# Patient Record
Sex: FEMALE | Race: OTHER
[De-identification: names, ages, dates, MRNs, and addresses within clinical notes are randomized per-mention and may not be internally consistent; named-entity substitution may affect disease eponyms.]

---

## 2022-02-18 ENCOUNTER — TRANSCRIPTION ENCOUNTER (OUTPATIENT)
Age: 27
End: 2022-02-18

## 2022-02-19 ENCOUNTER — RESULT REVIEW (OUTPATIENT)
Age: 27
End: 2022-02-19

## 2022-02-19 ENCOUNTER — INPATIENT (INPATIENT)
Facility: HOSPITAL | Age: 27
LOS: 0 days | Discharge: HOME | DRG: 770 | End: 2022-02-19
Attending: OBSTETRICS & GYNECOLOGY | Admitting: OBSTETRICS & GYNECOLOGY
Payer: COMMERCIAL

## 2022-02-19 VITALS
DIASTOLIC BLOOD PRESSURE: 92 MMHG | TEMPERATURE: 98 F | OXYGEN SATURATION: 98 % | RESPIRATION RATE: 18 BRPM | SYSTOLIC BLOOD PRESSURE: 137 MMHG | HEIGHT: 62 IN | WEIGHT: 190.04 LBS | HEART RATE: 100 BPM

## 2022-02-19 VITALS
RESPIRATION RATE: 20 BRPM | OXYGEN SATURATION: 97 % | SYSTOLIC BLOOD PRESSURE: 125 MMHG | DIASTOLIC BLOOD PRESSURE: 88 MMHG | HEART RATE: 81 BPM

## 2022-02-19 DIAGNOSIS — Z98.890 OTHER SPECIFIED POSTPROCEDURAL STATES: Chronic | ICD-10-CM

## 2022-02-19 LAB
ALBUMIN SERPL ELPH-MCNC: 4.8 G/DL — SIGNIFICANT CHANGE UP (ref 3.3–5)
ALP SERPL-CCNC: 93 U/L — SIGNIFICANT CHANGE UP (ref 40–120)
ALT FLD-CCNC: 18 U/L — SIGNIFICANT CHANGE UP (ref 10–45)
ANION GAP SERPL CALC-SCNC: 12 MMOL/L — SIGNIFICANT CHANGE UP (ref 5–17)
APPEARANCE UR: CLEAR — SIGNIFICANT CHANGE UP
APTT BLD: 34.5 SEC — SIGNIFICANT CHANGE UP (ref 27.5–35.5)
AST SERPL-CCNC: 18 U/L — SIGNIFICANT CHANGE UP (ref 10–40)
BACTERIA # UR AUTO: PRESENT /HPF
BASOPHILS # BLD AUTO: 0.03 K/UL — SIGNIFICANT CHANGE UP (ref 0–0.2)
BASOPHILS NFR BLD AUTO: 0.5 % — SIGNIFICANT CHANGE UP (ref 0–2)
BILIRUB SERPL-MCNC: 0.9 MG/DL — SIGNIFICANT CHANGE UP (ref 0.2–1.2)
BILIRUB UR-MCNC: NEGATIVE — SIGNIFICANT CHANGE UP
BLD GP AB SCN SERPL QL: NEGATIVE — SIGNIFICANT CHANGE UP
BUN SERPL-MCNC: 19 MG/DL — SIGNIFICANT CHANGE UP (ref 7–23)
CALCIUM SERPL-MCNC: 9.6 MG/DL — SIGNIFICANT CHANGE UP (ref 8.4–10.5)
CHLORIDE SERPL-SCNC: 104 MMOL/L — SIGNIFICANT CHANGE UP (ref 96–108)
CO2 SERPL-SCNC: 22 MMOL/L — SIGNIFICANT CHANGE UP (ref 22–31)
COLOR SPEC: YELLOW — SIGNIFICANT CHANGE UP
CREAT SERPL-MCNC: 0.75 MG/DL — SIGNIFICANT CHANGE UP (ref 0.5–1.3)
DIFF PNL FLD: NEGATIVE — SIGNIFICANT CHANGE UP
EOSINOPHIL # BLD AUTO: 0.1 K/UL — SIGNIFICANT CHANGE UP (ref 0–0.5)
EOSINOPHIL NFR BLD AUTO: 1.5 % — SIGNIFICANT CHANGE UP (ref 0–6)
EPI CELLS # UR: SIGNIFICANT CHANGE UP /HPF (ref 0–5)
GLUCOSE SERPL-MCNC: 99 MG/DL — SIGNIFICANT CHANGE UP (ref 70–99)
GLUCOSE UR QL: NEGATIVE — SIGNIFICANT CHANGE UP
HCG SERPL-ACNC: 8 MIU/ML — HIGH
HCT VFR BLD CALC: 42.4 % — SIGNIFICANT CHANGE UP (ref 34.5–45)
HGB BLD-MCNC: 14.4 G/DL — SIGNIFICANT CHANGE UP (ref 11.5–15.5)
IMM GRANULOCYTES NFR BLD AUTO: 0.2 % — SIGNIFICANT CHANGE UP (ref 0–1.5)
INR BLD: 0.97 — SIGNIFICANT CHANGE UP (ref 0.88–1.16)
KETONES UR-MCNC: NEGATIVE — SIGNIFICANT CHANGE UP
LEUKOCYTE ESTERASE UR-ACNC: ABNORMAL
LYMPHOCYTES # BLD AUTO: 1.81 K/UL — SIGNIFICANT CHANGE UP (ref 1–3.3)
LYMPHOCYTES # BLD AUTO: 27.7 % — SIGNIFICANT CHANGE UP (ref 13–44)
MCHC RBC-ENTMCNC: 31 PG — SIGNIFICANT CHANGE UP (ref 27–34)
MCHC RBC-ENTMCNC: 34 GM/DL — SIGNIFICANT CHANGE UP (ref 32–36)
MCV RBC AUTO: 91.4 FL — SIGNIFICANT CHANGE UP (ref 80–100)
MONOCYTES # BLD AUTO: 0.38 K/UL — SIGNIFICANT CHANGE UP (ref 0–0.9)
MONOCYTES NFR BLD AUTO: 5.8 % — SIGNIFICANT CHANGE UP (ref 2–14)
NEUTROPHILS # BLD AUTO: 4.21 K/UL — SIGNIFICANT CHANGE UP (ref 1.8–7.4)
NEUTROPHILS NFR BLD AUTO: 64.3 % — SIGNIFICANT CHANGE UP (ref 43–77)
NITRITE UR-MCNC: NEGATIVE — SIGNIFICANT CHANGE UP
NRBC # BLD: 0 /100 WBCS — SIGNIFICANT CHANGE UP (ref 0–0)
PH UR: 6 — SIGNIFICANT CHANGE UP (ref 5–8)
PLATELET # BLD AUTO: 295 K/UL — SIGNIFICANT CHANGE UP (ref 150–400)
POTASSIUM SERPL-MCNC: 4.2 MMOL/L — SIGNIFICANT CHANGE UP (ref 3.5–5.3)
POTASSIUM SERPL-SCNC: 4.2 MMOL/L — SIGNIFICANT CHANGE UP (ref 3.5–5.3)
PROT SERPL-MCNC: 8.3 G/DL — SIGNIFICANT CHANGE UP (ref 6–8.3)
PROT UR-MCNC: NEGATIVE MG/DL — SIGNIFICANT CHANGE UP
PROTHROM AB SERPL-ACNC: 11.7 SEC — SIGNIFICANT CHANGE UP (ref 10.6–13.6)
RBC # BLD: 4.64 M/UL — SIGNIFICANT CHANGE UP (ref 3.8–5.2)
RBC # FLD: 12.3 % — SIGNIFICANT CHANGE UP (ref 10.3–14.5)
RH IG SCN BLD-IMP: POSITIVE — SIGNIFICANT CHANGE UP
RH IG SCN BLD-IMP: POSITIVE — SIGNIFICANT CHANGE UP
SARS-COV-2 RNA SPEC QL NAA+PROBE: SIGNIFICANT CHANGE UP
SODIUM SERPL-SCNC: 138 MMOL/L — SIGNIFICANT CHANGE UP (ref 135–145)
SP GR SPEC: 1.02 — SIGNIFICANT CHANGE UP (ref 1–1.03)
UROBILINOGEN FLD QL: 0.2 E.U./DL — SIGNIFICANT CHANGE UP
WBC # BLD: 6.54 K/UL — SIGNIFICANT CHANGE UP (ref 3.8–10.5)
WBC # FLD AUTO: 6.54 K/UL — SIGNIFICANT CHANGE UP (ref 3.8–10.5)
WBC UR QL: < 5 /HPF — SIGNIFICANT CHANGE UP

## 2022-02-19 PROCEDURE — 76856 US EXAM PELVIC COMPLETE: CPT | Mod: 26

## 2022-02-19 PROCEDURE — 85730 THROMBOPLASTIN TIME PARTIAL: CPT

## 2022-02-19 PROCEDURE — 99285 EMERGENCY DEPT VISIT HI MDM: CPT

## 2022-02-19 PROCEDURE — 76830 TRANSVAGINAL US NON-OB: CPT | Mod: 26

## 2022-02-19 PROCEDURE — 88305 TISSUE EXAM BY PATHOLOGIST: CPT | Mod: 26

## 2022-02-19 PROCEDURE — 76856 US EXAM PELVIC COMPLETE: CPT

## 2022-02-19 PROCEDURE — 86901 BLOOD TYPING SEROLOGIC RH(D): CPT

## 2022-02-19 PROCEDURE — 84702 CHORIONIC GONADOTROPIN TEST: CPT

## 2022-02-19 PROCEDURE — 80053 COMPREHEN METABOLIC PANEL: CPT

## 2022-02-19 PROCEDURE — 76830 TRANSVAGINAL US NON-OB: CPT

## 2022-02-19 PROCEDURE — 36415 COLL VENOUS BLD VENIPUNCTURE: CPT

## 2022-02-19 PROCEDURE — 86850 RBC ANTIBODY SCREEN: CPT

## 2022-02-19 PROCEDURE — 85610 PROTHROMBIN TIME: CPT

## 2022-02-19 PROCEDURE — 81001 URINALYSIS AUTO W/SCOPE: CPT

## 2022-02-19 PROCEDURE — 85025 COMPLETE CBC W/AUTO DIFF WBC: CPT

## 2022-02-19 PROCEDURE — 87635 SARS-COV-2 COVID-19 AMP PRB: CPT

## 2022-02-19 PROCEDURE — 86900 BLOOD TYPING SEROLOGIC ABO: CPT

## 2022-02-19 PROCEDURE — 88305 TISSUE EXAM BY PATHOLOGIST: CPT

## 2022-02-19 DEVICE — MYOSURE TISSUE REMOVAL DEVICE XL: Type: IMPLANTABLE DEVICE | Status: FUNCTIONAL

## 2022-02-19 DEVICE — MYOSURE TISSUE REMOVAL DEVICE REACH: Type: IMPLANTABLE DEVICE | Status: FUNCTIONAL

## 2022-02-19 RX ORDER — ACETAMINOPHEN 500 MG
1000 TABLET ORAL EVERY 6 HOURS
Refills: 0 | Status: DISCONTINUED | OUTPATIENT
Start: 2022-02-19 | End: 2022-02-19

## 2022-02-19 RX ORDER — ACETAMINOPHEN 500 MG
2 TABLET ORAL
Qty: 0 | Refills: 0 | DISCHARGE
Start: 2022-02-19

## 2022-02-19 RX ORDER — SODIUM CHLORIDE 9 MG/ML
1000 INJECTION, SOLUTION INTRAVENOUS
Refills: 0 | Status: DISCONTINUED | OUTPATIENT
Start: 2022-02-19 | End: 2022-02-19

## 2022-02-19 RX ORDER — KETOROLAC TROMETHAMINE 30 MG/ML
30 SYRINGE (ML) INJECTION EVERY 8 HOURS
Refills: 0 | Status: DISCONTINUED | OUTPATIENT
Start: 2022-02-19 | End: 2022-02-19

## 2022-02-19 RX ORDER — SIMETHICONE 80 MG/1
80 TABLET, CHEWABLE ORAL EVERY 6 HOURS
Refills: 0 | Status: DISCONTINUED | OUTPATIENT
Start: 2022-02-19 | End: 2022-02-19

## 2022-02-19 RX ORDER — HYDROMORPHONE HYDROCHLORIDE 2 MG/ML
0.2 INJECTION INTRAMUSCULAR; INTRAVENOUS; SUBCUTANEOUS
Refills: 0 | Status: DISCONTINUED | OUTPATIENT
Start: 2022-02-19 | End: 2022-02-19

## 2022-02-19 RX ORDER — ONDANSETRON 8 MG/1
8 TABLET, FILM COATED ORAL EVERY 8 HOURS
Refills: 0 | Status: DISCONTINUED | OUTPATIENT
Start: 2022-02-19 | End: 2022-02-19

## 2022-02-19 RX ADMIN — SODIUM CHLORIDE 125 MILLILITER(S): 9 INJECTION, SOLUTION INTRAVENOUS at 14:03

## 2022-02-19 NOTE — ED PROVIDER NOTE - OBJECTIVE STATEMENT
28yo  female presents due to concerns for retained POC. Pt reports D&C at ~10wk EGA on . She states at follow up appointment with Dr. Bullock she had retained products so was given antibiotics and "medication for uterine contraction." At her subsequent appointment on  she had vaginal bleeding so was instructed to follow up again which she did yesterday. She states that at that time she had retained products so was instructed to present to ED for evaluation. Today she reports lower abdominal pain and subjective fevers last night. She denies vomiting, vaginal discharge or vaginal bleeding.

## 2022-02-19 NOTE — H&P ADULT - NSHPPHYSICALEXAM_GEN_ALL_CORE
PHYSICAL EXAM:   Vital Signs Last 24 Hrs  T(C): 36.9 (19 Feb 2022 08:23), Max: 36.9 (19 Feb 2022 08:23)  T(F): 98.5 (19 Feb 2022 08:23), Max: 98.5 (19 Feb 2022 08:23)  HR: 100 (19 Feb 2022 08:23) (100 - 100)  BP: 137/92 (19 Feb 2022 08:23) (137/92 - 137/92)  BP(mean): --  RR: 18 (19 Feb 2022 08:23) (18 - 18)  SpO2: 98% (19 Feb 2022 08:23) (98% - 98%)    **************************  Constitutional: Alert & Oriented x3, No acute distress  Respiratory: Clear to ausculation bilaterally; no wheezing, rhonchi, or crackles  Cardiovascular: regular rate and rhythm, no murmurs, or gallops  Gastrointestinal: soft, non tender, positive bowel sounds, no rebound or guarding   Pelvic exam:  deferred per attending   Extremities: no calf tenderness or swelling

## 2022-02-19 NOTE — H&P ADULT - HISTORY OF PRESENT ILLNESS
28yo  presents for retained POC from a D&C at 10wga . Pt was initially referred to a [facility] of unknown name by Dr. Bullock, her primary GYN, for MAB. Pt states the procedure was uncomplicated. She then followed up with Kobe , at which point he saw retained POC on US. She was asymptomatic at that point. She then returned , repeat US showed retained POC, she was given abx and cytotec. She then followed up again , US showed continued retained POC. Pt at that point had some light vaginal bleeding, which resolved 2/10. She then returned to the office yesterday, , again retained POC were seen on US in the office. Pt returned home, given precautions to go to ED for any concerns. Pt states that last night she had mild left sided pelvic pain as well as a fever, pt does not know how high. She then went to bed and came to the ED today. Denies continued fevers, pain, VB, abnormal vaginal discharge, chills, body aches. Pt had a croissant at 08:30.       OB Hx: SAB x 3, MAB as above  PMHx: denies  SHx: open appendectomy 2006  Meds: denies  Allergies: NKDA

## 2022-02-19 NOTE — ED PROVIDER NOTE - CLINICAL SUMMARY MEDICAL DECISION MAKING FREE TEXT BOX
Pt afebrile and hemodynamically stable. she has mild suprapubic ttp on exam. Denies vaginal bleeding today. Labs stable. US pelvis notable for retained POC with blood clot. Gynecology consulted. Will take pt to OR for d&c. Pt aware of plan.

## 2022-02-19 NOTE — H&P ADULT - ASSESSMENT
26yo  presents for retained POC from a D&C at 10wga .  -VSS, afebrile with WBC 6.5, no concerns for current sepsis  -Will add on for Class IV D&C   -NPO  -Consents  -T&S performed  -Covid pending    Discussed with Dr. Bullock

## 2022-02-19 NOTE — ED ADULT NURSE NOTE - CHIEF COMPLAINT QUOTE
Patient down for stress test at this time. Pt reports she had a D&C procedure on , however "some of the parts has not come out." This was confirmed by US , per pt report. Pt complains of lower abdominal pain since 2 days. Pt denies any vaginal bleeding, n/V.  4 para 0. Pt referred here by MD Bullock.

## 2022-02-19 NOTE — ED PROVIDER NOTE - ATTENDING CONTRIBUTION TO CARE
I discussed the plan of care of the patient directly with the PA while the patient was in the Emergency Department. VSS, mild suprapubic discomfort, ambulatory without issue . I have reviewed the ACP note and agree with the history, exam and plan of care as detailed by WALLY Grier

## 2022-02-19 NOTE — H&P ADULT - NSHPLABSRESULTS_GEN_ALL_CORE
LABS:                        14.4   6.54  )-----------( 295      ( 2022 09:13 )             42.4     -    138  |  104  |  19  ----------------------------<  99  4.2   |  22  |  0.75    Ca    9.6      2022 09:13    TPro  8.3  /  Alb  4.8  /  TBili  0.9  /  DBili  x   /  AST  18  /  ALT  18  /  AlkPhos  93  -    PT/INR - ( 2022 09:13 )   PT: 11.7 sec;   INR: 0.97          PTT - ( 2022 09:13 )  PTT:34.5 sec  Urinalysis Basic - ( 2022 11:22 )    Color: Yellow / Appearance: Clear / S.025 / pH: x  Gluc: x / Ketone: NEGATIVE  / Bili: Negative / Urobili: 0.2 E.U./dL   Blood: x / Protein: NEGATIVE mg/dL / Nitrite: NEGATIVE   Leuk Esterase: Trace / RBC: x / WBC < 5 /HPF   Sq Epi: x / Non Sq Epi: 0-5 /HPF / Bacteria: Present /HPF          RADIOLOGY & ADDITIONAL STUDIES:    ACC: [id number] EXAM:  US TRANSVAGINAL                        ACC: [id number] EXAM:  US PELVIC COMPLETE                          PROCEDURE DATE:  2022          INTERPRETATION:  CLINICAL INFORMATION: Assess for retained products of   conception. History of dilatation and cartilage in 2021.    LMP: Postpartum.    COMPARISON: None available.    TECHNIQUE:  Endovaginal and transabdominal pelvic sonogram.    FINDINGS:    Uterus: 8.6 cm x 5.5 cm x 6.5 cm. Dilated uterine cavity with echogenic   material likely representing blood clot. There is heterogeneous and   isoechoic vascular material in the fundus of the uterus concerning for   retained products of conception.  Endometrium: 2.6 cm. Homogeneously thickened, consistent with postpartum   history.    Right ovary: 3.1 cm x 2.0 cm x 1.8 cm. Within normal limits. Normal   arterial and venous waveforms.  Left ovary: 2.7 cm x 1.1 cm x 2.0 cm. Within normal limits. Normal   arterial and venous waveforms.    Fluid: Trace    IMPRESSION:  Findings are concerning for retained products of conception at the   uterine fundus. There is also a moderate blood clot in the uterine   cavity. In view the protracted clinical history possibility of a partial   molar pregnancy cannot be excluded. Correlation with beta hCG and GYN   consultation is suggested.    Resident Dr. Khaled Al Tawil communicated critical findings with Dr. Grier at 11:02 AM with readback and  by Dr. Donnelly    --- End of Report ---

## 2022-02-19 NOTE — ASU DISCHARGE PLAN (ADULT/PEDIATRIC) - NS MD DC FALL RISK RISK
For information on Fall & Injury Prevention, visit: [email or web address] OR  [email or web address] OR  https://www.cdc.gov/steadi/patient.html

## 2022-02-19 NOTE — ED PROVIDER NOTE - PHYSICAL EXAMINATION
VITAL SIGNS: I have reviewed nursing notes and confirm.  CONSTITUTIONAL: Well-developed; well-nourished; in no acute distress.   SKIN:  warm and dry, no acute rash.   HEAD:  normocephalic, atraumatic.  EYES: PERRL, EOM intact; conjunctiva and sclera clear.  ENT: No nasal discharge; airway clear.   NECK: Supple; non tender.  CARD: S1, S2 normal; no murmurs, gallops, or rubs. Regular rate and rhythm.   RESP:  Clear to auscultation b/l, no wheezes, rales or rhonchi.  ABD: Normal bowel sounds; soft; non-distended; mild lower abd ttp; no guarding/ rebound.  EXT: Normal ROM. No clubbing, cyanosis or edema. 2+ pulses to b/l ue/le.  NEURO: Alert, oriented, grossly unremarkable  PSYCH: Cooperative, mood and affect appropriate.

## 2022-02-19 NOTE — ASU DISCHARGE PLAN (ADULT/PEDIATRIC) - CARE PROVIDER_API CALL
Leyda Bullock)  Obstetrics and Gynecology  [address], [address]  [place], [place] [address]  Phone: [phone]  Fax: [phone]  Follow Up Time:

## 2022-02-19 NOTE — ED PROVIDER NOTE - CHIEF COMPLAINT
From: Missy Muller  To: Javier Schmitt MD  Sent: 6/7/2020 6:10 PM CDT  Subject: Medication Question    Hello, I’m in need of a refill for methotrexate and the pharmacy advised me that the dr rejected the refill request. But I see his note says refil qty 96 and start June 8. There must be a hiccup somewhere. Can dr please refill.   The patient is a 27y Female complaining of abdominal pain.

## 2022-02-19 NOTE — ED ADULT TRIAGE NOTE - CHIEF COMPLAINT QUOTE
Pt reports she had a D&C procedure on , however "some of the parts has not come out." This was confirmed by US , per pt report. Pt complains of lower abdominal pain since 2 days. Pt denies any vaginal bleeding, n/V.  4 para 0. Pt referred here by MD Bullock.

## 2022-02-19 NOTE — ED PROVIDER NOTE - CARE PLAN
Principal Discharge DX:	Suprapubic pain   1 Principal Discharge DX:	Retained products of conception after miscarriage

## 2022-02-22 LAB — SURGICAL PATHOLOGY STUDY: SIGNIFICANT CHANGE UP

## 2022-02-25 DIAGNOSIS — Z20.822 CONTACT WITH AND (SUSPECTED) EXPOSURE TO COVID-19: ICD-10-CM

## 2022-02-25 DIAGNOSIS — O02.1 MISSED ABORTION: ICD-10-CM

## 2022-02-25 DIAGNOSIS — Z91.018 ALLERGY TO OTHER FOODS: ICD-10-CM

## 2022-02-25 DIAGNOSIS — N92.6 IRREGULAR MENSTRUATION, UNSPECIFIED: ICD-10-CM

## 2022-02-25 DIAGNOSIS — R10.2 PELVIC AND PERINEAL PAIN: ICD-10-CM

## 2022-05-12 PROBLEM — Z78.9 OTHER SPECIFIED HEALTH STATUS: Chronic | Status: ACTIVE | Noted: 2022-02-19

## 2022-05-27 ENCOUNTER — TRANSCRIPTION ENCOUNTER (OUTPATIENT)
Age: 27
End: 2022-05-27

## 2022-05-27 ENCOUNTER — APPOINTMENT (OUTPATIENT)
Dept: HUMAN REPRODUCTION | Facility: CLINIC | Age: 27
End: 2022-05-27
Payer: COMMERCIAL

## 2022-05-27 PROCEDURE — 99204 OFFICE O/P NEW MOD 45 MIN: CPT | Mod: 95

## 2022-08-29 ENCOUNTER — APPOINTMENT (OUTPATIENT)
Dept: HUMAN REPRODUCTION | Facility: CLINIC | Age: 27
End: 2022-08-29

## 2024-08-08 ENCOUNTER — APPOINTMENT (OUTPATIENT)
Dept: INTERNAL MEDICINE | Facility: CLINIC | Age: 29
End: 2024-08-08

## 2024-08-08 PROBLEM — Z00.00 ANNUAL PHYSICAL EXAM: Status: ACTIVE | Noted: 2024-08-08

## 2024-08-08 PROBLEM — Z91.89 AT RISK FOR CANCER: Status: ACTIVE | Noted: 2024-08-08

## 2024-08-08 PROBLEM — E66.9 OBESITY (BMI 35.0-39.9 WITHOUT COMORBIDITY): Status: ACTIVE | Noted: 2024-08-08

## 2024-08-08 PROBLEM — Z91.89 COLON CANCER HIGH RISK: Status: ACTIVE | Noted: 2024-08-08

## 2024-08-08 PROBLEM — Z00.00 ENCOUNTER FOR PREVENTIVE HEALTH EXAMINATION: Status: ACTIVE | Noted: 2024-08-08

## 2024-08-08 PROCEDURE — 99385 PREV VISIT NEW AGE 18-39: CPT

## 2024-08-08 NOTE — HISTORY OF PRESENT ILLNESS
[FreeTextEntry1] :  for establish care and CPE [de-identified] : 29 year female came for establish care and CPE Pt has few medical issues ; 1 is concerned about her weight 2 multiple cancers run in her Family, father diagnosed at 40 with Thyroid CA, mother at 35 Colon CA, grandmother had Breast CA Currently pt is not in acute distress, denies fever, headache, chest pain, palpitations, shortness of breath.

## 2024-08-08 NOTE — HEALTH RISK ASSESSMENT
[Good] : ~his/her~  mood as  good [No] : No [0] : 2) Feeling down, depressed, or hopeless: Not at all (0) [Patient reported PAP Smear was normal] : Patient reported PAP Smear was normal [HIV test declined] : HIV test declined [Hepatitis C test declined] : Hepatitis C test declined [Never] : Never [[id number]] : 0 [PapSmearDate] : 01/22

## 2024-08-09 ENCOUNTER — TRANSCRIPTION ENCOUNTER (OUTPATIENT)
Age: 29
End: 2024-08-09

## 2024-08-17 ENCOUNTER — NON-APPOINTMENT (OUTPATIENT)
Age: 29
End: 2024-08-17

## 2024-08-17 DIAGNOSIS — Z78.9 OTHER SPECIFIED HEALTH STATUS: ICD-10-CM

## 2024-08-17 DIAGNOSIS — Z86.39 PERSONAL HISTORY OF OTHER ENDOCRINE, NUTRITIONAL AND METABOLIC DISEASE: ICD-10-CM

## 2024-08-17 DIAGNOSIS — Z80.8 FAMILY HISTORY OF MALIGNANT NEOPLASM OF OTHER ORGANS OR SYSTEMS: ICD-10-CM

## 2024-08-17 DIAGNOSIS — Z80.0 FAMILY HISTORY OF MALIGNANT NEOPLASM OF DIGESTIVE ORGANS: ICD-10-CM

## 2024-08-17 DIAGNOSIS — Z12.11 ENCOUNTER FOR SCREENING FOR MALIGNANT NEOPLASM OF COLON: ICD-10-CM

## 2024-10-14 ENCOUNTER — APPOINTMENT (OUTPATIENT)
Dept: GASTROENTEROLOGY | Facility: CLINIC | Age: 29
End: 2024-10-14

## 2025-01-22 ENCOUNTER — APPOINTMENT (OUTPATIENT)
Dept: INTERNAL MEDICINE | Facility: CLINIC | Age: 30
End: 2025-01-22

## (undated) DEVICE — PRESSURE INFUSOR BAG 3000ML

## (undated) DEVICE — TUBING AQUILEX OUTFLOW

## (undated) DEVICE — VENODYNE/SCD SLEEVE CALF MEDIUM

## (undated) DEVICE — WARMING BLANKET UPPER ADULT

## (undated) DEVICE — PACK D&C

## (undated) DEVICE — SOL IRR BAG NS 0.9% 3000ML

## (undated) DEVICE — DRAPE TOWEL BLUE 17" X 24"

## (undated) DEVICE — MYOSURE SOCK

## (undated) DEVICE — DRSG TELFA 3 X 8

## (undated) DEVICE — MYOSURE SCOPE SEAL

## (undated) DEVICE — DRAPE IRRIGATION POUCH 19X23"

## (undated) DEVICE — GLV 8 PROTEXIS (WHITE)

## (undated) DEVICE — GOWN TRIMAX XXL

## (undated) DEVICE — TUBING AQUILEX INFLOW

## (undated) DEVICE — TUBING TUR 2 PRONG

## (undated) DEVICE — MYOSURE CANISTER AQUILEX KIT